# Patient Record
Sex: FEMALE | Race: WHITE
[De-identification: names, ages, dates, MRNs, and addresses within clinical notes are randomized per-mention and may not be internally consistent; named-entity substitution may affect disease eponyms.]

---

## 2017-11-14 ENCOUNTER — HOSPITAL ENCOUNTER (OUTPATIENT)
Dept: HOSPITAL 47 - WWCWWP | Age: 27
Discharge: HOME | End: 2017-11-14
Payer: COMMERCIAL

## 2017-11-14 DIAGNOSIS — Z01.419: Primary | ICD-10-CM

## 2017-11-14 NOTE — [facility] - HISTORY AND PHYSICAL



CHIEF COMPLAINT:

The patient is here for her routine gynecologic exam.



HPI:

This is a 27-year-old, G6, P6, with an LMP of 11/01/2017.  She has been using

withdrawal for birth control.  She states it has been about 2 years since her last

pelvic exam.  She is without gynecologic complaints.  She is declining any method of

birth control at this time.



PAST MEDICAL HISTORY:

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  She also has a neurologic tic,

elevated cholesterol, and bipolar disorder.  She was once told she may have multiple

sclerosis, but was never told that again and has never been treated for this.



MEDICATIONS:

Ibuprofen p.r.n.



ALLERGIES:

No known drug allergies.



PAST SURGICAL HISTORY:

Tonsillectomy in the past.



PAST OB HISTORY:

Six vaginal deliveries.



PAST GYN HISTORY:

She has no history of STDs.  Menses are regular every month.



SOCIAL HISTORY:

She smokes about a half a pack cigarettes per day and denies alcohol and drug use.  She

has been with her boyfriend for about 7 years and lives with him.  She does not work

outside the home and states she has had a difficult time finding a job since she is a

convicted felon.  She states she was in snf for a total of 14 months, but did not

explain why she was in snf.



FAMILY HISTORY:

Father had some type of cancer, but she does not know the type.  There is no known

family history of cancer of the breast, uterus, ovaries or colon.



REVIEW OF SYSTEMS:

Weight has been stable.  She denies respiratory, cardiac or GI problems.



PHYSICAL EXAM:

Blood pressure 129/61, height 5 feet 1 inch, weight 92 pounds, BMI 19.  Temperature

97.1, pulse 72.  This is a well-developed, thin white female who is alert and oriented

x3, in no acute distress.

HEENT:  Poor dentition.  Otherwise unremarkable.

NECK:  Supple without mass or thyromegaly.

CHEST AND LUNGS:  Clear to auscultation.

HEART:  Regular rate and rhythm.

BREASTS:  Without mass or discharge.

AXILLARY:  Negative for adenopathy.

BACK:  Negative for CVA tenderness.

ABDOMEN:  Soft, nontender, without palpable masses.

PELVIC:  Normal external genitalia.  Cervix and vagina appear normal.  There is no

unusual discharge.  There is no cervical motion tenderness. The uterus is retroverted,

nongravid size and nontender.  There are no palpable adnexal masses or tenderness.

RECTAL:  Deferred.

EXTREMITIES:  Nontender.



IMPRESSION:

1. A 28-year-old female with normal gynecologic exam.

2. Using withdrawal for birth control and is declining any other method of birth

    control at this time.



PLAN:

1. Pap smear was performed.

2. Self-breast examination was discussed.

3. We have had a long discussion regarding birth control methods, including

    sterilization and hormonal methods.  She is declining any method of birth control

    at this time.

4. I have recommended a daily multivitamin if there is any chance that she will get

    pregnant.

5. She will return in 1 year.





MMODL / IJN: [id number] / Job#: [id number]

## 2020-10-21 ENCOUNTER — HOSPITAL ENCOUNTER (OUTPATIENT)
Dept: HOSPITAL 47 - RADUSWWP | Age: 30
Discharge: HOME | End: 2020-10-21
Attending: OBSTETRICS & GYNECOLOGY
Payer: COMMERCIAL

## 2020-10-21 DIAGNOSIS — Z3A.18: ICD-10-CM

## 2020-10-21 DIAGNOSIS — O36.80X1: Primary | ICD-10-CM

## 2020-10-21 PROCEDURE — 76805 OB US >/= 14 WKS SNGL FETUS: CPT

## 2020-10-21 NOTE — US
EXAMINATION TYPE: US OB >= 14 wk fetus

 

DATE OF EXAM: 10/21/2020

 

COMPARISON: None

 

CLINICAL HISTORY: Confirm dates Z36, viability O36.80 unknown dates

 

TECHNIQUE:  OBTA

 

GESTATIONAL AGE / DATING

Physician Established: (18 weeks/1 days) 

** EDC:  03/23/2021

Dates by LMP: LMP unknown 

Dates by First Scan: No previous this is first scan  

Dates by Current Scan: (18 weeks/1 days) 

** EDC: 03/23/2021 

 

FETAL SURVEY

IUP:  Single

PLACENTA: Fundal     

PREVIA:  No Previa

** OMAR: 13.6 cm Normal

CERVICAL LENGTH (transabdominal: norm > 3.0cm): 3.1 cm

 

 

FETAL BIOMETRY

PRESENTATION:  Variable

BPD: 4.2 cm

**  18 weeks / 5 days

HC: 14.9 cm

**  18 weeks / 0 days

AC: 12.8 cm

**  18 weeks / 3 days

FL: 2.6 cm

**  18 weeks / 0 days

ESTIMATED FETAL WEIGHT IN GRAMS:  227 grams

ESTIMATED FETAL WEIGHT IN LBS/OZ:  0 lbs. 8 oz. 

WEIGHT PERCENTAGE BASED ON ESTABLISHED DATES:   47.0%

HC/AC: 1.2 Normal

FL/AC: 20.3

HEART RATE:  137 bpm 

RHYTHM:  Normal

 

 

IMPRESSION:

Single live intrauterine gestation, with fetal heart rate of 137 bpm. Estimated gestational age 18 we
eks 1 day.